# Patient Record
Sex: FEMALE | Race: BLACK OR AFRICAN AMERICAN | URBAN - METROPOLITAN AREA
[De-identification: names, ages, dates, MRNs, and addresses within clinical notes are randomized per-mention and may not be internally consistent; named-entity substitution may affect disease eponyms.]

---

## 2021-03-25 ENCOUNTER — OFFICE VISIT (OUTPATIENT)
Dept: URBAN - METROPOLITAN AREA CLINIC 40 | Facility: CLINIC | Age: 41
End: 2021-03-25
Payer: COMMERCIAL

## 2021-03-25 ENCOUNTER — LAB OUTSIDE AN ENCOUNTER (OUTPATIENT)
Dept: URBAN - METROPOLITAN AREA CLINIC 40 | Facility: CLINIC | Age: 41
End: 2021-03-25

## 2021-03-25 ENCOUNTER — WEB ENCOUNTER (OUTPATIENT)
Dept: URBAN - METROPOLITAN AREA CLINIC 40 | Facility: CLINIC | Age: 41
End: 2021-03-25

## 2021-03-25 DIAGNOSIS — K21.9 GASTROESOPHAGEAL REFLUX DISEASE WITHOUT ESOPHAGITIS: ICD-10-CM

## 2021-03-25 DIAGNOSIS — R07.0 THROAT PAIN: ICD-10-CM

## 2021-03-25 DIAGNOSIS — R13.14 PHARYNGOESOPHAGEAL DYSPHAGIA: ICD-10-CM

## 2021-03-25 DIAGNOSIS — R09.89 GLOBUS SENSATION: ICD-10-CM

## 2021-03-25 PROCEDURE — 99244 OFF/OP CNSLTJ NEW/EST MOD 40: CPT | Performed by: INTERNAL MEDICINE

## 2021-03-25 NOTE — HPI-TODAY'S VISIT:
The patient was referred by Dr. David Bravo for dysphagia .   A copy of this document is being forwarded to the referring provider. 39 yo AAF. c/o Globus sensation that is not improving with protonix/pepcid. No prior EGD. occ feels food get stuck in chest, but mostly it is only in her throat. c/o pain in the left side of her throat. has seen ENT and has had CT done. No issues noted. pain is very frustrating for her

## 2021-03-25 NOTE — PHYSICAL EXAM NECK/THYROID:
normal appearance , mild TTP in left side of throat , no deformities , trachea midline , Thyroid normal size , no thyroid nodules , no masses , no JVD , thyroid nontender

## 2021-03-31 ENCOUNTER — OFFICE VISIT (OUTPATIENT)
Dept: URBAN - METROPOLITAN AREA SURGERY CENTER 30 | Facility: SURGERY CENTER | Age: 41
End: 2021-03-31
Payer: COMMERCIAL

## 2021-03-31 DIAGNOSIS — K31.89 ACQUIRED DEFORMITY OF DUODENUM: ICD-10-CM

## 2021-03-31 DIAGNOSIS — R13.19 CERVICAL DYSPHAGIA: ICD-10-CM

## 2021-03-31 DIAGNOSIS — R12 BURNING REFLUX: ICD-10-CM

## 2021-03-31 PROCEDURE — G8907 PT DOC NO EVENTS ON DISCHARG: HCPCS | Performed by: INTERNAL MEDICINE

## 2021-03-31 PROCEDURE — 43239 EGD BIOPSY SINGLE/MULTIPLE: CPT | Performed by: INTERNAL MEDICINE

## 2021-04-15 ENCOUNTER — OFFICE VISIT (OUTPATIENT)
Dept: URBAN - METROPOLITAN AREA CLINIC 74 | Facility: CLINIC | Age: 41
End: 2021-04-15

## 2021-04-16 ENCOUNTER — OFFICE VISIT (OUTPATIENT)
Dept: URBAN - METROPOLITAN AREA TELEHEALTH 2 | Facility: TELEHEALTH | Age: 41
End: 2021-04-16
Payer: COMMERCIAL

## 2021-04-16 ENCOUNTER — LAB OUTSIDE AN ENCOUNTER (OUTPATIENT)
Dept: URBAN - METROPOLITAN AREA TELEHEALTH 2 | Facility: TELEHEALTH | Age: 41
End: 2021-04-16

## 2021-04-16 VITALS — BODY MASS INDEX: 31.08 KG/M2 | HEIGHT: 67 IN | WEIGHT: 198 LBS

## 2021-04-16 DIAGNOSIS — R09.89 GLOBUS SENSATION: ICD-10-CM

## 2021-04-16 DIAGNOSIS — K21.9 GASTROESOPHAGEAL REFLUX DISEASE WITHOUT ESOPHAGITIS: ICD-10-CM

## 2021-04-16 DIAGNOSIS — R13.14 PHARYNGOESOPHAGEAL DYSPHAGIA: ICD-10-CM

## 2021-04-16 DIAGNOSIS — R07.0 THROAT PAIN: ICD-10-CM

## 2021-04-16 DIAGNOSIS — R68.81 EARLY SATIETY: ICD-10-CM

## 2021-04-16 DIAGNOSIS — K31.89 RETAINED FOOD IN STOMACH: ICD-10-CM

## 2021-04-16 PROCEDURE — 99213 OFFICE O/P EST LOW 20 MIN: CPT | Performed by: INTERNAL MEDICINE

## 2021-04-16 RX ORDER — FAMOTIDINE 40 MG/1
1 TABLET TABLET, FILM COATED ORAL ONCE A DAY
Qty: 90 TABLET | Refills: 2

## 2021-04-16 NOTE — HPI-TODAY'S VISIT:
The patient was referred by Dr. David Bravo for dysphagia .   A copy of this document is being forwarded to the referring provider. 39 yo AAF. c/o Globus sensation that is not improving with protonix/pepcid. occ feels food get stuck in chest, but mostly it is only in her throat. c/o pain in the left side of her throat. has seen ENT and has had CT done. No issues noted. pain is better. c/o early satiety. recent EGD-food in the stomach. mild gastritis. No H.pylori/EOE/celiac disease per pathology. has not done gastric emptying study yet

## 2021-05-25 ENCOUNTER — TELEPHONE ENCOUNTER (OUTPATIENT)
Dept: URBAN - METROPOLITAN AREA CLINIC 92 | Facility: CLINIC | Age: 41
End: 2021-05-25

## 2021-05-25 ENCOUNTER — OFFICE VISIT (OUTPATIENT)
Dept: URBAN - METROPOLITAN AREA TELEHEALTH 2 | Facility: TELEHEALTH | Age: 41
End: 2021-05-25
Payer: COMMERCIAL

## 2021-05-25 DIAGNOSIS — K21.9 GASTROESOPHAGEAL REFLUX DISEASE WITHOUT ESOPHAGITIS: ICD-10-CM

## 2021-05-25 DIAGNOSIS — K31.89 RETAINED FOOD IN STOMACH: ICD-10-CM

## 2021-05-25 DIAGNOSIS — R09.89 GLOBUS SENSATION: ICD-10-CM

## 2021-05-25 DIAGNOSIS — R07.0 THROAT PAIN: ICD-10-CM

## 2021-05-25 DIAGNOSIS — R68.81 EARLY SATIETY: ICD-10-CM

## 2021-05-25 DIAGNOSIS — R13.14 PHARYNGOESOPHAGEAL DYSPHAGIA: ICD-10-CM

## 2021-05-25 PROCEDURE — 99213 OFFICE O/P EST LOW 20 MIN: CPT | Performed by: INTERNAL MEDICINE

## 2021-05-25 RX ORDER — FAMOTIDINE 40 MG/1
1 TABLET TABLET, FILM COATED ORAL ONCE A DAY
Qty: 90 TABLET | Refills: 2 | Status: ACTIVE | COMMUNITY

## 2021-05-25 RX ORDER — PANTOPRAZOLE SODIUM 40 MG/1
1 TABLET TABLET, DELAYED RELEASE ORAL ONCE A DAY
Qty: 90 TABLET | Refills: 2

## 2021-05-25 NOTE — PHYSICAL EXAM HENT:
Head , normocephalic , atraumatic , Face , Face within normal limits , Ears , External ears within normal limits , Nose/Nasopharynx , External nose  normal appearance , Mouth and Throat , Oral cavity appearance normal. TTP on left side of the face

## 2021-05-25 NOTE — HPI-TODAY'S VISIT:
The patient was referred by Dr. David Bravo for dysphagia .   A copy of this document is being forwarded to the referring provider. 39 yo AAF. c/o Globus sensation that is improving with protonix/pepcid. dysphagia is better. c/o pain in the left side of her throat/face. has a cracked wisdom tooth on that side. has seen ENT and has had CT done. No issues noted. pain is very frustrating for her. recent EGD-food in the stomach. mild gastritis. No H.pylori/EOE/celiac disease per pathology. GES-normal.

## 2021-08-24 ENCOUNTER — OFFICE VISIT (OUTPATIENT)
Dept: URBAN - METROPOLITAN AREA TELEHEALTH 2 | Facility: TELEHEALTH | Age: 41
End: 2021-08-24

## 2021-10-12 ENCOUNTER — TELEPHONE ENCOUNTER (OUTPATIENT)
Dept: URBAN - METROPOLITAN AREA CLINIC 92 | Facility: CLINIC | Age: 41
End: 2021-10-12

## 2021-10-12 ENCOUNTER — OFFICE VISIT (OUTPATIENT)
Dept: URBAN - METROPOLITAN AREA TELEHEALTH 2 | Facility: TELEHEALTH | Age: 41
End: 2021-10-12
Payer: COMMERCIAL

## 2021-10-12 VITALS — HEIGHT: 67 IN

## 2021-10-12 DIAGNOSIS — R07.0 THROAT PAIN: ICD-10-CM

## 2021-10-12 DIAGNOSIS — R09.89 GLOBUS SENSATION: ICD-10-CM

## 2021-10-12 DIAGNOSIS — R68.81 EARLY SATIETY: ICD-10-CM

## 2021-10-12 DIAGNOSIS — K31.89 RETAINED FOOD IN STOMACH: ICD-10-CM

## 2021-10-12 DIAGNOSIS — K21.9 GASTROESOPHAGEAL REFLUX DISEASE WITHOUT ESOPHAGITIS: ICD-10-CM

## 2021-10-12 DIAGNOSIS — R13.14 PHARYNGOESOPHAGEAL DYSPHAGIA: ICD-10-CM

## 2021-10-12 PROBLEM — 162397003: Status: ACTIVE | Noted: 2021-03-25

## 2021-10-12 PROBLEM — 442076002: Status: ACTIVE | Noted: 2021-04-16

## 2021-10-12 PROBLEM — 386211005: Status: ACTIVE | Noted: 2021-04-16

## 2021-10-12 PROCEDURE — 99213 OFFICE O/P EST LOW 20 MIN: CPT | Performed by: INTERNAL MEDICINE

## 2021-10-12 RX ORDER — PANTOPRAZOLE SODIUM 40 MG/1
1 TABLET TABLET, DELAYED RELEASE ORAL ONCE A DAY
Qty: 90 TABLET | Refills: 2

## 2021-10-12 RX ORDER — FAMOTIDINE 40 MG/1
1 TABLET TABLET, FILM COATED ORAL ONCE A DAY
Qty: 90 TABLET | Refills: 2 | Status: ACTIVE | COMMUNITY

## 2021-10-12 RX ORDER — PANTOPRAZOLE SODIUM 40 MG/1
1 TABLET TABLET, DELAYED RELEASE ORAL ONCE A DAY
Qty: 90 TABLET | Refills: 2 | Status: ACTIVE | COMMUNITY

## 2021-10-12 NOTE — HPI-TODAY'S VISIT:
The patient was referred by Dr. David Bravo for dysphagia .   A copy of this document is being forwarded to the referring provider. 41 yo AAF. c/o Globus sensation that is improving with protonix/pepcid. stopped protonix now. has GERD 3-4 times/week. dysphagia is better. c/o pain in the left side of her throat/face. has a cracked wisdom tooth on that side. going to have it removed. seems to be the cause of the pain. pain is better after antibiotics. has seen ENT and has had CT done. No issues noted. recent EGD-food in the stomach. mild gastritis. No H.pylori/EOE/celiac disease per pathology. Gastric emptying study-normal.

## 2022-02-09 ENCOUNTER — DASHBOARD ENCOUNTERS (OUTPATIENT)
Age: 42
End: 2022-02-09

## 2022-02-11 ENCOUNTER — OFFICE VISIT (OUTPATIENT)
Dept: URBAN - METROPOLITAN AREA CLINIC 40 | Facility: CLINIC | Age: 42
End: 2022-02-11

## 2022-02-11 PROBLEM — 266435005: Status: ACTIVE | Noted: 2021-03-25

## 2022-02-11 PROBLEM — 40739000: Status: ACTIVE | Noted: 2021-03-25

## 2022-02-11 RX ORDER — PANTOPRAZOLE SODIUM 40 MG/1
1 TABLET TABLET, DELAYED RELEASE ORAL ONCE A DAY
Qty: 90 TABLET | Refills: 2

## 2022-02-11 RX ORDER — FAMOTIDINE 40 MG/1
1 TABLET TABLET, FILM COATED ORAL ONCE A DAY
Qty: 90 TABLET | Refills: 2 | Status: ACTIVE | COMMUNITY

## 2022-02-11 RX ORDER — PANTOPRAZOLE SODIUM 40 MG/1
1 TABLET TABLET, DELAYED RELEASE ORAL ONCE A DAY
Qty: 90 TABLET | Refills: 2 | Status: ACTIVE | COMMUNITY